# Patient Record
Sex: FEMALE | Race: WHITE | Employment: OTHER | URBAN - METROPOLITAN AREA
[De-identification: names, ages, dates, MRNs, and addresses within clinical notes are randomized per-mention and may not be internally consistent; named-entity substitution may affect disease eponyms.]

---

## 2019-04-16 ENCOUNTER — APPOINTMENT (OUTPATIENT)
Dept: CT IMAGING | Facility: HOSPITAL | Age: 74
End: 2019-04-16
Attending: EMERGENCY MEDICINE
Payer: MEDICARE

## 2019-04-16 ENCOUNTER — APPOINTMENT (OUTPATIENT)
Dept: GENERAL RADIOLOGY | Facility: HOSPITAL | Age: 74
End: 2019-04-16
Attending: EMERGENCY MEDICINE
Payer: MEDICARE

## 2019-04-16 PROCEDURE — 71045 X-RAY EXAM CHEST 1 VIEW: CPT | Performed by: EMERGENCY MEDICINE

## 2019-04-16 PROCEDURE — 70450 CT HEAD/BRAIN W/O DYE: CPT | Performed by: EMERGENCY MEDICINE
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
## 2019-04-17 PROBLEM — F03.90 DEMENTIA (HCC): Status: ACTIVE | Noted: 2019-04-17

## 2019-04-17 PROBLEM — F02.80 MAJOR NEUROCOGNITIVE DISORDER DUE TO ALZHEIMER'S DISEASE (HCC): Status: ACTIVE | Noted: 2019-04-17

## 2019-04-17 PROBLEM — G30.9 MAJOR NEUROCOGNITIVE DISORDER DUE TO ALZHEIMER'S DISEASE (HCC): Status: ACTIVE | Noted: 2019-04-17

## 2019-04-17 NOTE — BH LEVEL OF CARE ASSESSMENT
Level of Care Assessment Note    General Questions  Why are you here?: Pt states \"I don't know where I am, someone is playing tricks on me.  This is crazy\" Pt denies having any SI/HI   Precipitating Events: Pt was brought to [facility] ER via EMS from home with daughter, pt is not on DNR. Pt is ambulatory, she does not use any assistive devices but she does require assistance in completing ADLs  History of Present Illness: Per pt's daughter pt is dxed with Dementia. Pt ahs hx of Scizophrenia and Bipoalr dx.  Per p concerned about her own safety and pt's safety. Pt's daughter does not feel safe to take pt home. Per pt's daughter pt bailey snot have any SI/HI. Pt does not have any hx of SI.  Pt is ambulatory, she does not use any assistive devices but she does require ass Allegations of Inappropriate Physical Contact: No  Have you ever damaged/destroyed property or thought about it?: No    Access to Means  Has access to means to attempt suicide or harm others or property: Yes  Description of Access: Sara Young (n/a)    Current/Previous MH/CD Providers  Hospitalizations, Placements, Therapy, Detox: No                          Current/Previous MH/CD Treatment  Recovery Support Groups: Denies Past History  History of Seclusion/Restraint: No    Alcohol Use  How ofte of Volume: Ordinary  Clarity: Clear  Cognition  Concentration: Impaired  Memory: Recent memory impaired;Remote memory impaired  Orientation Level: Oriented to person;Disoriented to place; Disoriented to time;Disoriented to situation  Insight: Poor  Fair/poo

## 2019-04-17 NOTE — ED NOTES
Report called to RN at [facility].  pts daughter updated on POC and room number

## 2019-04-17 NOTE — ED PROVIDER NOTES
Patient Seen in: [facility] Emergency Department    History   Patient presents with:  Altered Mental Status (neurologic)    Stated Complaint: increased confusion     HPI    Patient is a 78-year-old female who has a history of dementia, bipolar and s tenderness. ABDOMEN: + Bowel sounds, soft, nontender, nondistended. No rebound, no guarding, no hepatosplenomegaly. EXTREMITIES: Full range of motion, no tenderness, good capillary refill. SKIN: No rash, good turgor. NEURO: Patient answers her name. the emergency department. Patient did have possible UTI was given Rocephin. Patient was seen by [facility] crisis worker will be admitted for further management.             Disposition and Plan     Clinical Impression:  Alzheimer's dementia wit

## 2019-05-18 NOTE — ED PROVIDER NOTES
Patient Seen in: [facility] Emergency Department    History   Patient presents with:  Eval-P (psychiatric)    Stated Complaint: eval-p    HPI    This is a 61-year-old female who lives at a dementia unit.   The patient has dementia patient is really u not pale. There is no icterus. Oral mucosa Is wet. No facial trauma. The neck is supple. LUNGS: Clear to auscultation, there is no wheezing or retraction. No crackles. CV: Cardiovascular is regular without murmurs or rubs.     ABD: The abdomen is unit.  I have Asya Wang evaluate her. But at this present time she has no focal findings. And the patient's work-up, including TSH comprehensive urinalysis and CBC was all within normal limits. .  The patient is medically cleared she can go back to the

## 2019-05-18 NOTE — ED NOTES
Spoke with Jaimee Patel RN from [facility]. Per RN, \"sometime during the week pt tried to leave facility through 3rd floor window, window does not open. Pt unhappy with daughter for putting her at [facility] and unhappy with staff. \"  \"pt got onto a elevator toda

## 2019-05-18 NOTE — ED INITIAL ASSESSMENT (HPI)
Pt from [facility]. Per EMS, \"pt stopped talking to staff and answering questions and began trying to leave facility. Pt stated she was going to leave through the window. \"  Pt a/ox1 per normal.  Pt denies SI/HI
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
## 2019-05-18 NOTE — ED NOTES
Pt sitting in chair at bedside, pt needs reeducation about why she is here and to stay in room. Pt easily redirected.

## 2019-05-19 PROBLEM — F03.90 DEMENTIA (HCC): Status: ACTIVE | Noted: 2019-05-19
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
## 2019-05-19 NOTE — ED NOTES
Called pharmacy to release resperidone I also ordered 0.5 ativan to prepare for her departure to [facility]

## 2019-05-19 NOTE — ED NOTES
Patient is agitated she remains in soft wrist restraint  I called report  At this time the bed is not ready

## 2019-05-19 NOTE — ED NOTES
Pt pacing in room and into hallway, pt redirected but resisting staff. Staff returned pt to room and into bed. Blankets, pillows provided and lights lowered.

## 2019-05-19 NOTE — ED NOTES
Pt's gown changed upon ED arrival. Non-skid socks donned on Pt. Pt assisted in shaheen-anal care while removing and securing Pt's underwear. 240 ml water was given. TV turned on for Pt. Warm blanket provided.

## 2019-05-19 NOTE — ED PROVIDER NOTES
Update note at 5:30 AM.  Patient was seen and medically cleared for psychiatry earlier in the ED yesterday. She was initially discharged and then who presented reporting suicidal ideation.   She has been accepted for admission to [facility] Screws will be transf

## 2019-05-19 NOTE — ED PROVIDER NOTES
Patient Seen in: [facility] Emergency Department    History   Patient presents with:  Eval-P (psychiatric)    Stated Complaint: Bebe TORRES    This is a 52-year-old female who presents with for psych evaluation she was actually seen here earlier b There is no icterus. Oral mucosa Is wet. No facial trauma. The neck is supple. LUNGS: Clear to auscultation, there is no wheezing or retraction. No crackles. CV: Cardiovascular is regular without murmurs or rubs.     ABD: The abdomen is soft nondi

## 2019-05-19 NOTE — ED NOTES
Called Pt's daughter Marla Sage ([phone]) for an update regarding the Pt, voicemail message left to callback A-Pod.

## 2019-05-19 NOTE — ED NOTES
Report given to Estelita Anderson RN. Endorsed accordingly. Pt on cart, sleeping. Awaits [facility] transfer.

## 2019-05-19 NOTE — ED NOTES
Pt ambulated with steady gait to BR. Pt needs redirection. Pt standing in doorway, pt states she wants to go home.   MD brannon

## 2019-05-19 NOTE — ED INITIAL ASSESSMENT (HPI)
Pt to ED brought by [facility] for psych eval. Per EMS report, Pt was evaluated here in the ED earlier for the same complaint. The same Medics brought Pt back to [facility] and upon arriving there, Pt stated clearly that \"If you leave me I will kill my self. \" P

## 2019-05-19 NOTE — ED NOTES
Pt needs redirection to stay in room. Puzzle book offered, pt declines. Juice provided. Pt removed gown and standing in doorway. Gown reapplied and pt redirected to stay in room.

## 2019-05-19 NOTE — ED NOTES
Patient is cooperative eating lunch now we removed the restraints I updated DANIELE with the patient current condition waiting for transport room is ready

## 2019-06-05 PROBLEM — F03.90 DEMENTIA (HCC): Status: RESOLVED | Noted: 2019-05-19 | Resolved: 2019-06-05

## 2020-10-15 ENCOUNTER — HOSPITAL ENCOUNTER (EMERGENCY)
Facility: HOSPITAL | Age: 75
Discharge: HOME | End: 2020-10-15
Attending: EMERGENCY MEDICINE
Payer: MEDICARE

## 2020-10-15 ENCOUNTER — APPOINTMENT (OUTPATIENT)
Dept: CT IMAGING | Facility: HOSPITAL | Age: 75
End: 2020-10-15
Attending: EMERGENCY MEDICINE
Payer: MEDICARE

## 2020-10-15 VITALS
HEART RATE: 63 BPM | DIASTOLIC BLOOD PRESSURE: 77 MMHG | BODY MASS INDEX: 20 KG/M2 | OXYGEN SATURATION: 96 % | WEIGHT: 121.25 LBS | TEMPERATURE: 98 F | SYSTOLIC BLOOD PRESSURE: 100 MMHG | RESPIRATION RATE: 18 BRPM

## 2020-10-15 DIAGNOSIS — S09.8XXA BLUNT HEAD TRAUMA, INITIAL ENCOUNTER: Primary | ICD-10-CM

## 2020-10-15 PROCEDURE — 99284 EMERGENCY DEPT VISIT MOD MDM: CPT

## 2020-10-15 PROCEDURE — 70450 CT HEAD/BRAIN W/O DYE: CPT | Performed by: EMERGENCY MEDICINE

## 2020-10-15 NOTE — ED NOTES
Pt moving self to end of cart. Moved to evangelista bed in front of nurses station for direct observation for safety.

## 2020-10-15 NOTE — ED PROVIDER NOTES
Patient Seen in: [facility] Emergency Department      History   Patient presents with:  Fall    Stated Complaint: fall    HPI    This is a 52-year-old female with past medical history of bipolar affective, dementia and depression.   Patient was at nu no rubs or gallops. ABDOMEN: Soft, nontender and nondistended. Normoactive bowel sounds. No rebound. No guarding. EXTREMITIES: Good range of motion of upper and lower extremities. No deformities or bruising noted. Warm with brisk capillary refill. again noted. Dictated by (CST): Patricia Shepherd MD on 10/15/2020 at 3:36 PM     Finalized by (CST): Patricia Shepherd MD on 10/15/2020 at 3:40 PM         MDM      CT scan brain was obtained and demonstrated chronic microvascular ischemic changes.   No

## 2020-10-15 NOTE — ED INITIAL ASSESSMENT (HPI)
Witnessed fall at nursing home. Per EMS pt fell striking L side of head, no reported LOC. Pt denies complaints. EMS report mentation at baseline. No blood thinners.

## 2020-10-23 ENCOUNTER — HOSPITAL ENCOUNTER (EMERGENCY)
Facility: HOSPITAL | Age: 75
Discharge: HOME | End: 2020-10-23
Attending: EMERGENCY MEDICINE
Payer: MEDICARE

## 2020-10-23 ENCOUNTER — APPOINTMENT (OUTPATIENT)
Dept: GENERAL RADIOLOGY | Facility: HOSPITAL | Age: 75
End: 2020-10-23
Attending: EMERGENCY MEDICINE
Payer: MEDICARE

## 2020-10-23 VITALS
WEIGHT: 121.25 LBS | TEMPERATURE: 100 F | BODY MASS INDEX: 20.2 KG/M2 | RESPIRATION RATE: 15 BRPM | DIASTOLIC BLOOD PRESSURE: 66 MMHG | OXYGEN SATURATION: 97 % | SYSTOLIC BLOOD PRESSURE: 131 MMHG | HEART RATE: 69 BPM | HEIGHT: 64.96 IN

## 2020-10-23 DIAGNOSIS — U07.1 COVID-19: Primary | ICD-10-CM

## 2020-10-23 PROCEDURE — 85025 COMPLETE CBC W/AUTO DIFF WBC: CPT | Performed by: EMERGENCY MEDICINE

## 2020-10-23 PROCEDURE — 80053 COMPREHEN METABOLIC PANEL: CPT | Performed by: EMERGENCY MEDICINE

## 2020-10-23 PROCEDURE — 99285 EMERGENCY DEPT VISIT HI MDM: CPT

## 2020-10-23 PROCEDURE — 81003 URINALYSIS AUTO W/O SCOPE: CPT | Performed by: EMERGENCY MEDICINE

## 2020-10-23 PROCEDURE — 99453 REM MNTR PHYSIOL PARAM SETUP: CPT

## 2020-10-23 PROCEDURE — 93010 ELECTROCARDIOGRAM REPORT: CPT

## 2020-10-23 PROCEDURE — 71045 X-RAY EXAM CHEST 1 VIEW: CPT | Performed by: EMERGENCY MEDICINE

## 2020-10-23 PROCEDURE — 96360 HYDRATION IV INFUSION INIT: CPT

## 2020-10-23 PROCEDURE — 93005 ELECTROCARDIOGRAM TRACING: CPT

## 2020-10-23 RX ORDER — SODIUM CHLORIDE 9 MG/ML
125 INJECTION, SOLUTION INTRAVENOUS CONTINUOUS
Status: DISCONTINUED | OUTPATIENT
Start: 2020-10-23 | End: 2020-10-23

## 2020-10-23 RX ORDER — ONDANSETRON 4 MG/1
4 TABLET, ORALLY DISINTEGRATING ORAL EVERY 4 HOURS PRN
Qty: 10 TABLET | Refills: 0 | Status: SHIPPED | OUTPATIENT
Start: 2020-10-23 | End: 2020-10-30

## 2020-10-23 NOTE — ED INITIAL ASSESSMENT (HPI)
Pt to ED via ambulance from [facility] for increased lethargy. Upon arrival pt a&ox1/4 per baseline due to hx of dementia.

## 2020-10-23 NOTE — CM/SW NOTE
Disregard note below. [place] has pule oximetry at their facility. Patient instructions included. [place] staff to check pulse ox q shift. 1. Orientation to the device, purpose, and return demo given  2.  Instructions reviewed to check with pulse ox

## 2020-10-23 NOTE — ED NOTES
Report called to Sergio Pace RN @ [facility]. DOMINICK Pace verbalized understanding of patient's d/c instructions.

## 2021-02-22 ENCOUNTER — HOSPITAL ENCOUNTER (EMERGENCY)
Facility: HOSPITAL | Age: 76
Discharge: HOME | End: 2021-02-22
Attending: EMERGENCY MEDICINE
Payer: MEDICARE

## 2021-02-22 ENCOUNTER — APPOINTMENT (OUTPATIENT)
Dept: CT IMAGING | Facility: HOSPITAL | Age: 76
End: 2021-02-22
Attending: EMERGENCY MEDICINE
Payer: MEDICARE

## 2021-02-22 VITALS
RESPIRATION RATE: 16 BRPM | DIASTOLIC BLOOD PRESSURE: 82 MMHG | HEART RATE: 87 BPM | BODY MASS INDEX: 23.92 KG/M2 | SYSTOLIC BLOOD PRESSURE: 127 MMHG | HEIGHT: 62 IN | WEIGHT: 130 LBS | TEMPERATURE: 98 F | OXYGEN SATURATION: 97 %

## 2021-02-22 DIAGNOSIS — G30.9 MAJOR NEUROCOGNITIVE DISORDER DUE TO ALZHEIMER'S DISEASE (HCC): ICD-10-CM

## 2021-02-22 DIAGNOSIS — S01.01XA SCALP LACERATION, INITIAL ENCOUNTER: ICD-10-CM
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
## 2021-02-22 DIAGNOSIS — F02.80 MAJOR NEUROCOGNITIVE DISORDER DUE TO ALZHEIMER'S DISEASE (HCC): ICD-10-CM

## 2021-02-22 PROCEDURE — 70450 CT HEAD/BRAIN W/O DYE: CPT | Performed by: EMERGENCY MEDICINE

## 2021-02-22 PROCEDURE — 72125 CT NECK SPINE W/O DYE: CPT | Performed by: EMERGENCY MEDICINE

## 2021-02-22 PROCEDURE — 12002 RPR S/N/AX/GEN/TRNK2.6-7.5CM: CPT

## 2021-02-22 PROCEDURE — 99284 EMERGENCY DEPT VISIT MOD MDM: CPT

## 2021-02-22 NOTE — ED PROVIDER NOTES
Patient Seen in: [facility] Emergency Department      History   Patient presents with:  Laceration/Abrasion  Fall    Stated Complaint: fall, head lac    HPI/Subjective:   HPI    30-year-old female presents emergency department from [facility] the exam.  Handwashing was performed prior to and after the exam.  Stethoscope and any equipment used during my examination was cleaned with super sani-cloth germicidal wipes following the exam.         Vital signs reviewed  General appearance: Patient is dictation software. As a result errors may occur. When identified these errors have been corrected.  While every attempt is made to correct errors during dictation discrepancies may still exist                         Disposition and Plan     Clinical Impre

## 2021-02-22 NOTE — ED NOTES
[address] contacted for transport to [facility] eta is 20 minutes

## 2021-02-22 NOTE — ED INITIAL ASSESSMENT (HPI)
Pt from [facility]. Pt had unwitnessed fall in cafeteria. Pt has laceration to the back of the head, bleeding controlled upon arrival. Unknown LOC. Pt has hx of dementia per NH staff at baseline mentation, no alterations in mental status.

## 2021-04-18 ENCOUNTER — HOSPITAL ENCOUNTER (EMERGENCY)
Facility: HOSPITAL | Age: 76
Discharge: HOME | End: 2021-04-18
Attending: EMERGENCY MEDICINE
Payer: MEDICARE

## 2021-04-18 ENCOUNTER — APPOINTMENT (OUTPATIENT)
Dept: CT IMAGING | Facility: HOSPITAL | Age: 76
End: 2021-04-18
Attending: EMERGENCY MEDICINE
Payer: MEDICARE

## 2021-04-18 VITALS
DIASTOLIC BLOOD PRESSURE: 69 MMHG | RESPIRATION RATE: 16 BRPM | TEMPERATURE: 98 F | BODY MASS INDEX: 24 KG/M2 | HEART RATE: 66 BPM | OXYGEN SATURATION: 100 % | SYSTOLIC BLOOD PRESSURE: 137 MMHG | WEIGHT: 130.06 LBS

## 2021-04-18 DIAGNOSIS — S00.93XA CONTUSION OF HEAD, UNSPECIFIED PART OF HEAD, INITIAL ENCOUNTER: ICD-10-CM

## 2021-04-18 DIAGNOSIS — S01.81XA FACIAL LACERATION, INITIAL ENCOUNTER: Primary | ICD-10-CM

## 2021-04-18 PROCEDURE — 99284 EMERGENCY DEPT VISIT MOD MDM: CPT

## 2021-04-18 PROCEDURE — 70450 CT HEAD/BRAIN W/O DYE: CPT | Performed by: EMERGENCY MEDICINE

## 2021-04-18 PROCEDURE — 72125 CT NECK SPINE W/O DYE: CPT | Performed by: EMERGENCY MEDICINE

## 2021-04-18 PROCEDURE — 12011 RPR F/E/E/N/L/M 2.5 CM/<: CPT

## 2021-04-18 NOTE — ED INITIAL ASSESSMENT (HPI)
Patient here with c/o fall from NH. Patient has hx of alzheimer's and wonders around the unit at the [facility]. Staff heard patient fall but did not witness it. Patient was standing by the times staff got to her.   Patient has laceration to forehead and c/o lef

## 2021-04-18 NOTE — ED PROVIDER NOTES
Patient Seen in: [facility] Emergency Department      History   Patient presents with:  Fall    Stated Complaint: Fall, laceration    HPI/Subjective:   HPI    71-year-old female with history of dementia presents from the nursing home for evaluation clear, uvula midline. Forehead contusion with 1 cm laceration to the right brow. No facial bone tenderness or step-off. Scalp is atraumatic. NECK: No midline cervical spine tenderness or step-off. HEART: Regular rate and rhythm, no murmurs.   LUNGS: anesthetized with lidocaine locally. The wound was cleansed and irrigated copiously. There was no visible foreign body, vessel, nerve, bone , joint space or tendon within the wound. The wound was closed using a simple closure with 5-0 nylon.   The quality

## 2021-09-11 ENCOUNTER — HOSPITAL ENCOUNTER (EMERGENCY)
Facility: HOSPITAL | Age: 76
Discharge: HOME | End: 2021-09-11
Attending: EMERGENCY MEDICINE
Payer: OTHER MISCELLANEOUS

## 2021-09-11 VITALS
RESPIRATION RATE: 19 BRPM | OXYGEN SATURATION: 100 % | HEART RATE: 62 BPM | HEIGHT: 64 IN | DIASTOLIC BLOOD PRESSURE: 87 MMHG | TEMPERATURE: 98 F | SYSTOLIC BLOOD PRESSURE: 132 MMHG | BODY MASS INDEX: 21.34 KG/M2 | WEIGHT: 125 LBS

## 2021-09-11 DIAGNOSIS — S01.01XA SCALP LACERATION, INITIAL ENCOUNTER: Primary | ICD-10-CM

## 2021-09-11 PROCEDURE — 12001 RPR S/N/AX/GEN/TRNK 2.5CM/<: CPT

## 2021-09-11 PROCEDURE — 99283 EMERGENCY DEPT VISIT LOW MDM: CPT

## 2021-09-11 RX ORDER — LIDOCAINE AND PRILOCAINE 25; 25 MG/G; MG/G
CREAM TOPICAL ONCE
Status: COMPLETED | OUTPATIENT
Start: 2021-09-11 | End: 2021-09-11

## 2021-09-11 NOTE — ED PROVIDER NOTES
Patient Seen in: [facility] Emergency Department      History   Patient presents with:  Fall    Stated Complaint: STAFF FOUND PT ON FLOOR  DEMENTIA+  LAC TO RIGHT HEAD BLD CONTROLLED  NO BLD TH*    Subjective:   HPI    Patient is a 71-year-old woman reactive to light. Neck: Normal range of motion. Neck supple. Cardiovascular: Normal rate, regular rhythm  Pulmonary/Chest: Normal effort. No accessory muscle use. No clubbing, no cyanosis. Abdominal: Soft.  Bowel sounds are normal.   Neurological: P

## 2021-09-11 NOTE — ED INITIAL ASSESSMENT (HPI)
STAFF AT [facility] FOUND PT ON FLOOR  CALLED EMS FOR LAC TO RIGHT HEAD BLD CONTROLLED  DEMENTIA+  See note from hospice RN

## 2021-09-11 NOTE — ED QUICK NOTES
Call to [address] = [facility] next crew = 30min eta. Call to [facility]: Jae Hobson received report.

## 2021-09-11 NOTE — ED QUICK NOTES
ermd at bs - lac at right head stapled. Pt dischg back to [facility] - awaiting registration completion.

## 2021-12-02 ENCOUNTER — APPOINTMENT (OUTPATIENT)
Dept: GENERAL RADIOLOGY | Facility: HOSPITAL | Age: 76
End: 2021-12-02
Attending: EMERGENCY MEDICINE
Payer: OTHER MISCELLANEOUS

## 2021-12-02 ENCOUNTER — HOSPITAL ENCOUNTER (EMERGENCY)
Facility: HOSPITAL | Age: 76
Discharge: HOME | End: 2021-12-02
Attending: EMERGENCY MEDICINE
Payer: OTHER MISCELLANEOUS

## 2021-12-02 VITALS
HEIGHT: 62 IN | DIASTOLIC BLOOD PRESSURE: 61 MMHG | SYSTOLIC BLOOD PRESSURE: 116 MMHG | WEIGHT: 120 LBS | HEART RATE: 62 BPM | RESPIRATION RATE: 16 BRPM | TEMPERATURE: 99 F | OXYGEN SATURATION: 94 % | BODY MASS INDEX: 22.08 KG/M2

## 2021-12-02 DIAGNOSIS — J18.9 COMMUNITY ACQUIRED PNEUMONIA OF LEFT LOWER LOBE OF LUNG: Primary | ICD-10-CM

## 2021-12-02 PROCEDURE — 71045 X-RAY EXAM CHEST 1 VIEW: CPT | Performed by: EMERGENCY MEDICINE

## 2021-12-02 PROCEDURE — 85025 COMPLETE CBC W/AUTO DIFF WBC: CPT | Performed by: EMERGENCY MEDICINE

## 2021-12-02 PROCEDURE — 99285 EMERGENCY DEPT VISIT HI MDM: CPT

## 2021-12-02 PROCEDURE — 99284 EMERGENCY DEPT VISIT MOD MDM: CPT

## 2021-12-02 PROCEDURE — 96360 HYDRATION IV INFUSION INIT: CPT

## 2021-12-02 PROCEDURE — 80053 COMPREHEN METABOLIC PANEL: CPT | Performed by: EMERGENCY MEDICINE

## 2021-12-02 RX ORDER — AZITHROMYCIN 250 MG/1
TABLET, FILM COATED ORAL
Qty: 6 TABLET | Refills: 0 | Status: SHIPPED | OUTPATIENT
Start: 2021-12-02 | End: 2021-12-07

## 2021-12-02 RX ORDER — AMOXICILLIN AND CLAVULANATE POTASSIUM 875; 125 MG/1; MG/1
1 TABLET, FILM COATED ORAL 2 TIMES DAILY
Qty: 20 TABLET | Refills: 0 | Status: SHIPPED | OUTPATIENT
Start: 2021-12-02 | End: 2021-12-12

## 2021-12-02 NOTE — CM/SW NOTE
EL called daughter , no answer > left call back number. Pt is a full code , according to daughter , the full code status was in error and she does not want her mom to be resuscitated.      Please call [facility] [phone] for any questions or follow up

## 2021-12-02 NOTE — ED PROVIDER NOTES
Patient Seen in: [facility] Emergency Department      History   Patient presents with:  Failure To Thrive    Stated Complaint: not eating, on hospice     Subjective:   HPI    The patient is a 20-year-old female with a history of dementia, nonverbal, extremities purposefully. HEENT: No lymphadenopathy. Mucus membranes moist but her lips are a little dry. Supple neck without any meningismus or rigidity  Cardiovascular:    Regular rhythm without murmurs rubs or gallops, no peripheral edema or JVD.   Ra CBC W/ DIFFERENTIAL[[id number]]          Abnormal            Final result                 Please view results for these tests on the individual orders. Blood was obtained peripheral IV access was established.   She was given a liter of IV normal properly filled out DNR/DNI order where she only wants comfort measures to help keep the patient comfortable.   I did discuss resending her hospice order, and admitting her for IV antibiotics, but patient's daughter declined stating that she wants to minimi

## 2021-12-02 NOTE — CM/SW NOTE
Informed by hospice nurse that pt was coming into hospital  from [facility] after a change in condition. Pt is a full code and no one able to get a hold of daughter to see if she wanted aggressive treatment. Daughter has not responded. ED case manager update.

## 2021-12-02 NOTE — ED INITIAL ASSESSMENT (HPI)
Patient from [facility]. Per staff, patient not eating or taking medications past two days. Per patients daughter, Bill Hernandes, patient is hospice and daughter is in process of filling out DNR paperwork.

## 2021-12-02 NOTE — CM/SW NOTE
Received a call from [facility] that [facility] was sending patient to the ED for evaluation for change in condition. [place] unable to get reach of daughter so patient was sent to the ED. Pt is a full code but on hospice.       4:45pm - RN spoke with Simin Main

## 2022-04-07 ENCOUNTER — HOSPITAL ENCOUNTER (EMERGENCY)
Facility: HOSPITAL | Age: 77
Discharge: HOME | End: 2022-04-07
Attending: EMERGENCY MEDICINE
Payer: OTHER MISCELLANEOUS

## 2022-04-07 ENCOUNTER — APPOINTMENT (OUTPATIENT)
Dept: CT IMAGING | Facility: HOSPITAL | Age: 77
End: 2022-04-07
Attending: EMERGENCY MEDICINE
Payer: OTHER MISCELLANEOUS

## 2022-04-07 VITALS
OXYGEN SATURATION: 100 % | SYSTOLIC BLOOD PRESSURE: 133 MMHG | DIASTOLIC BLOOD PRESSURE: 40 MMHG | TEMPERATURE: 98 F | RESPIRATION RATE: 18 BRPM | HEART RATE: 64 BPM

## 2022-04-07 DIAGNOSIS — S09.8XXA BLUNT HEAD TRAUMA, INITIAL ENCOUNTER: Primary | ICD-10-CM

## 2022-04-07 DIAGNOSIS — S01.01XA LACERATION OF SCALP, INITIAL ENCOUNTER: ICD-10-CM

## 2022-04-07 LAB — GLUCOSE BLD-MCNC: 98 MG/DL (ref 70–99)

## 2022-04-07 PROCEDURE — 12001 RPR S/N/AX/GEN/TRNK 2.5CM/<: CPT

## 2022-04-07 PROCEDURE — 70450 CT HEAD/BRAIN W/O DYE: CPT | Performed by: EMERGENCY MEDICINE

## 2022-04-07 PROCEDURE — 99284 EMERGENCY DEPT VISIT MOD MDM: CPT

## 2022-04-07 PROCEDURE — 72125 CT NECK SPINE W/O DYE: CPT | Performed by: EMERGENCY MEDICINE

## 2022-04-07 PROCEDURE — 82962 GLUCOSE BLOOD TEST: CPT

## 2022-04-07 RX ORDER — ACETAMINOPHEN 650 MG/1
650 SUPPOSITORY RECTAL EVERY 6 HOURS PRN
COMMUNITY

## 2022-04-07 RX ORDER — MORPHINE SULFATE 100 MG/5ML
SOLUTION ORAL
COMMUNITY

## 2022-04-07 RX ORDER — HALOPERIDOL 2 MG/ML
1 SOLUTION ORAL EVERY 6 HOURS PRN
COMMUNITY

## 2022-04-07 RX ORDER — HALOPERIDOL 2 MG/1
2 TABLET ORAL EVERY 6 HOURS
COMMUNITY
End: 2022-04-07

## 2022-04-07 RX ORDER — MIRTAZAPINE 45 MG/1
45 TABLET, FILM COATED ORAL NIGHTLY
COMMUNITY

## 2022-04-07 RX ORDER — SERTRALINE HYDROCHLORIDE 100 MG/1
100 TABLET, FILM COATED ORAL DAILY
COMMUNITY

## 2022-04-07 RX ORDER — GUAIFENESIN DEXTROMETHORPHAN HYDROBROMIDE ORAL SOLUTION 10; 100 MG/5ML; MG/5ML
15 SOLUTION ORAL EVERY 4 HOURS PRN
COMMUNITY

## 2022-04-07 RX ORDER — HYOSCYAMINE SULFATE 0.125 MG
0.12 TABLET,DISINTEGRATING ORAL EVERY 4 HOURS PRN
COMMUNITY

## 2022-04-07 RX ORDER — BISACODYL 10 MG
10 SUPPOSITORY, RECTAL RECTAL
COMMUNITY

## 2022-04-07 RX ORDER — LORAZEPAM 2 MG/ML
1 SOLUTION, CONCENTRATE ORAL EVERY 4 HOURS PRN
COMMUNITY
Start: 2022-04-05

## 2022-04-07 RX ORDER — ACETAMINOPHEN 325 MG/1
650 TABLET ORAL EVERY 6 HOURS PRN
COMMUNITY

## 2022-04-07 NOTE — ED INITIAL ASSESSMENT (HPI)
From [facility]. Had an unwitnessed fall. Staff heard the fall. Pt fell out of her wheelchair. Laceration on back of head. Arrives with head wrapper, bleeding controlled. Hx of dementia. A&Ox1 per norm.

## 2022-04-07 NOTE — ED QUICK NOTES
Received report from DOMINICK Saini. Rounding Completed. Pt is resting on cart with eyes closed; respirations even, unlabored. Plan of Care reviewed. Waiting for CT imaging results. Elimination needs assessed. Bed is locked and in lowest position. Call light within reach.